# Patient Record
Sex: FEMALE | Race: WHITE | NOT HISPANIC OR LATINO | Employment: OTHER | URBAN - METROPOLITAN AREA
[De-identification: names, ages, dates, MRNs, and addresses within clinical notes are randomized per-mention and may not be internally consistent; named-entity substitution may affect disease eponyms.]

---

## 2018-04-23 PROBLEM — M25.552 BILATERAL HIP PAIN: Status: ACTIVE | Noted: 2018-04-23

## 2018-04-23 PROBLEM — M25.551 BILATERAL HIP PAIN: Status: ACTIVE | Noted: 2018-04-23

## 2018-04-23 PROBLEM — M25.552 PAIN OF BOTH HIP JOINTS: Status: ACTIVE | Noted: 2018-04-23

## 2018-04-23 PROBLEM — M25.551 PAIN OF BOTH HIP JOINTS: Status: ACTIVE | Noted: 2018-04-23

## 2020-02-24 PROBLEM — K31.89: Status: ACTIVE | Noted: 2020-02-24

## 2020-02-24 PROBLEM — Z76.89 ESTABLISHING CARE WITH NEW DOCTOR, ENCOUNTER FOR: Status: ACTIVE | Noted: 2020-02-24

## 2020-02-24 PROBLEM — E03.9 HYPOTHYROIDISM: Status: ACTIVE | Noted: 2020-02-24

## 2020-02-24 PROBLEM — E78.00 ELEVATED CHOLESTEROL: Status: ACTIVE | Noted: 2020-02-24

## 2020-02-25 PROBLEM — F32.9 MAJOR DEPRESSIVE DISORDER: Status: ACTIVE | Noted: 2020-02-25

## 2021-06-15 PROBLEM — Z79.899 ENCOUNTER FOR LONG-TERM CURRENT USE OF MEDICATION: Status: ACTIVE | Noted: 2020-02-24

## 2021-06-15 PROBLEM — N95.1 MENOPAUSAL STATE: Status: ACTIVE | Noted: 2021-06-15

## 2021-06-15 PROBLEM — R79.89 ABNORMAL TSH: Status: ACTIVE | Noted: 2021-06-15

## 2021-06-15 PROBLEM — M25.559 HIP PAIN: Status: ACTIVE | Noted: 2018-04-23

## 2021-06-15 PROBLEM — M25.651 STIFFNESS OF BOTH HIP JOINTS: Status: ACTIVE | Noted: 2021-06-15

## 2021-06-15 PROBLEM — Z00.00 MEDICARE ANNUAL WELLNESS VISIT, SUBSEQUENT: Status: ACTIVE | Noted: 2020-02-24

## 2021-06-15 PROBLEM — E55.9 VITAMIN D DEFICIENCY: Status: ACTIVE | Noted: 2021-06-15

## 2021-06-15 PROBLEM — R25.1 TREMORS OF NERVOUS SYSTEM: Status: ACTIVE | Noted: 2021-06-15

## 2021-06-15 PROBLEM — F32.A DEPRESSION: Status: ACTIVE | Noted: 2020-02-25

## 2021-06-15 PROBLEM — R26.9 ALTERED GAIT: Status: ACTIVE | Noted: 2021-06-15

## 2021-06-15 PROBLEM — M25.652 STIFFNESS OF BOTH HIP JOINTS: Status: ACTIVE | Noted: 2021-06-15

## 2021-06-15 PROBLEM — R26.89 BALANCE PROBLEM: Status: ACTIVE | Noted: 2021-06-15

## 2021-07-12 PROBLEM — F10.29 ALCOHOL DEPENDENCE WITH UNSPECIFIED ALCOHOL-INDUCED DISORDER (HCC): Status: ACTIVE | Noted: 2021-07-12

## 2021-07-20 ENCOUNTER — OFFICE VISIT (OUTPATIENT)
Dept: NEUROLOGY | Facility: MEDICAL CENTER | Age: 66
End: 2021-07-20
Attending: PSYCHIATRY & NEUROLOGY
Payer: MEDICARE

## 2021-07-20 VITALS
RESPIRATION RATE: 16 BRPM | BODY MASS INDEX: 20.61 KG/M2 | SYSTOLIC BLOOD PRESSURE: 118 MMHG | HEART RATE: 84 BPM | WEIGHT: 123.68 LBS | TEMPERATURE: 97.8 F | HEIGHT: 65 IN | DIASTOLIC BLOOD PRESSURE: 68 MMHG | OXYGEN SATURATION: 97 %

## 2021-07-20 DIAGNOSIS — G20.A1 PARKINSON DISEASE (HCC): ICD-10-CM

## 2021-07-20 PROCEDURE — 99203 OFFICE O/P NEW LOW 30 MIN: CPT | Performed by: PSYCHIATRY & NEUROLOGY

## 2021-07-20 PROCEDURE — 99211 OFF/OP EST MAY X REQ PHY/QHP: CPT | Performed by: PSYCHIATRY & NEUROLOGY

## 2021-07-20 ASSESSMENT — FIBROSIS 4 INDEX: FIB4 SCORE: 1.77

## 2021-07-20 NOTE — PATIENT INSTRUCTIONS
Parkinson's Disease  Parkinson's disease is a type of movement disorder. It is a long-term condition that gets worse over time (is progressive). Each person with Parkinson's disease is affected differently.  This condition limits your ability to control movements and move your body normally. The condition can range from mild to severe. Parkinson's disease tends to get worse slowly over several years.  What are the causes?  Parkinson's disease results from a loss of brain cells (neurons) that make a brain chemical called dopamine. Dopamine is needed to control movement. As the condition gets worse, neurons make less dopamine. This makes it hard to move or control your movements.  The exact cause of the loss of neurons and why they make less dopamine is not known. Factors related to genes and the environment may contribute to the cause of Parkinson's disease.  What increases the risk?  The following factors may make you more likely to develop this condition:  · Being male.  · Being age 60 or older.  · Having a family history of Parkinson's disease.  · Having had a traumatic brain injury.  · Having experienced depression.  · Having been exposed to toxins, such as pesticides.  What are the signs or symptoms?  Symptoms of this condition can vary. The main symptoms are related to movement. These include:  · A tremor or shaking while you are resting that you cannot control.  · Stiffness in your neck, arms, and legs (rigidity).  · Slowing of movement. You may lose facial expressions and have trouble making small movements that are needed to button clothing or brush your teeth.  · An abnormal walk. You may walk with short, shuffling steps.  · Loss of balance and stability when standing. You may sway, fall backward, and have trouble making turns.  Other symptoms include:  · Mental or cognitive changes including depression, anxiety, having false beliefs (delusions), or seeing, hearing, or feeling things that do not exist  (hallucinations).  · Trouble speaking or swallowing.  · Changes in bowel or bladder functions including constipation, having to go urgently or frequently, or not being able to control your bowel or bladder.  · Changes in sleep habits or trouble sleeping.  Parkinson's disease may be graded by severity of your condition as mild, moderate, or advanced. Parkinson's disease progression is different for everyone. You may not progress to the advanced stage.  · Mild Parkinson's disease involves:  ? Movement problems that do not affect daily activities.  ? Movement problems on one side of the body.  · Moderate Parkinson's disease involves:  ? Movement problems on both sides of the body.  ? Slowing of movement.  ? Coordination and balance problems.  · Advanced Parkinson's disease involves:  ? Extreme difficulty walking.  ? Inability to live alone safely.  ? Signs of dementia, such as having trouble remembering things, doing daily tasks such as getting dressed, and problem solving.  How is this diagnosed?  This condition is diagnosed by a specialist. A diagnosis may be made based on symptoms, your medical history, and a physical exam. You may also have brain imaging tests to check for a loss of dopamine-producing areas of the brain.  How is this treated?  There is no cure for Parkinson's disease. Treatment focuses on managing your symptoms. Treatment may include:  · Medicines. Everyone responds to medicines differently. Your response may change over time. Work with your health care provider to find the best medicines for you.  · Speech, occupational, and physical therapy.  · Deep brain stimulation surgery to reduce tremors and other involuntary movements.  Follow these instructions at home:  Medicines  · Take over-the-counter and prescription medicines only as told by your health care provider.  · Avoid taking medicines that can affect thinking, such as pain or sleeping medicines.  Eating and drinking  · Follow instructions  from your health care provider about eating or drinking restrictions.  · Do not drink alcohol.  Activity  · Talk with your health care provider about if it is safe for you to drive.  · Do exercises as told by your health care provider or physical therapist.  Lifestyle         · Install grab bars and railings in your home to prevent falls.  · Do not use any products that contain nicotine or tobacco, such as cigarettes, e-cigarettes, and chewing tobacco. If you need help quitting, ask your health care provider.  · Consider joining a support group for people with Parkinson's disease.  General instructions  · Work with your health care provider to determine what you need help with and what your safety needs are.  · Keep all follow-up visits as told by your health care provider, including any visits with a physical therapist, speech therapist, or occupational therapist. This is important.  Contact a health care provider if:  · Medicines do not help your symptoms.  · You are unsteady or have fallen at home.  · You need more support to function well at home.  · You have trouble swallowing.  · You have severe constipation.  · You are having problems with side effects from your medicines.  · You feel confused, anxious, or depressed.  Get help right away if you:  · Are injured after a fall.  · See or hear things that are not real.  · Cannot swallow without choking.  · Have chest pain or trouble breathing.  · Do not feel safe at home.  · Have thoughts about hurting yourself or others.  If you ever feel like you may hurt yourself or others, or have thoughts about taking your own life, get help right away. You can go to your nearest emergency department or call:  · Your local emergency services (911 in the [place]  · A suicide crisis helpline, such as the National Suicide Prevention Lifeline at 1-524.789.3341. This is open 24 hours a day.  Summary  · Parkinson's disease is a long-term condition that gets worse over time. This  condition limits your ability to control your movements and move your body normally.  · There is no cure for Parkinson's disease. Treatment focuses on managing your symptoms.  · Work with your health care provider to determine what you need help with and what your safety needs are.  · Keep all follow-up visits as told by your health care provider, including any visits with a physical therapist, speech therapist, or occupational therapist. This is important.  This information is not intended to replace advice given to you by your health care provider. Make sure you discuss any questions you have with your health care provider.  Document Released: 12/15/2001 Document Revised: 03/05/2020 Document Reviewed: 03/05/2020  Elsevier Patient Education © 2020 [employer]

## 2021-07-20 NOTE — PROGRESS NOTES
Chief Complaint   Patient presents with   • New Patient     Balance problem/Altered gait/Tremors of nervous system       History of present illness:  Maggie Heller 66 y.o. female with depression referred for gait imbalance and tremors.  She has left leg tremor. She has trouble walking and turning to the left.   She was a very athletic person and has gradual progression of these symptoms over the last 2 years, worsened over the last 6-7 months.   Her handwriting is running together and becoming smaller.     Movement-Specific ROS  Anosmia: No   Sleep: She may take a half pill of Advil PM as needed for insomnia, which is effective.    Speech: No problems.   Swallowing: No problems.   Constipation: Yes. This is a chronic problem. Uses a fiber supplement.    Urination: No problems.   Dizziness: No  Cognition: Decline in her memory.   Balance: Feels imbalanced when she walks. She has to talk to herself to pick her leg up to walk.     Past medical history:   Past Medical History:   Diagnosis Date   • Allergy    • Anxiety    • Arthritis    • Clotting disorder (HCC)    • Depression    • Hyperlipidemia    • IBD (inflammatory bowel disease)        Past surgical history:   Past Surgical History:   Procedure Laterality Date   • COLONOSCOPY  7/23/2013    Performed by Chico Morales M.D. at [facility]   • COLONOSCOPY  7/23/2013    Performed by Chico Morales M.D. at [facility]   • APPENDECTOMY     • OTHER ABDOMINAL SURGERY      appendix / bowel rupture       Family history:   Family History   Problem Relation Age of Onset   • Osteoporosis Mother    • Allergies Father    • Depression Father    • Allergies Sister    • Depression Sister    • Hyperlipidemia Sister    • Cancer Brother    • Diabetes Brother        Social history:   Social History     Socioeconomic History   • Marital status:      Spouse name: Not on file   • Number of children: Not on file   • Years of education: Not on file   • Highest  education level: Not on file   Occupational History   • Not on file   Tobacco Use   • Smoking status: Former Smoker     Packs/day: 0.00     Quit date: 2010     Years since quittin.5   • Smokeless tobacco: Never Used   Vaping Use   • Vaping Use: Never used   Substance and Sexual Activity   • Alcohol use: Yes     Comment: wine   • Drug use: No   • Sexual activity: Not Currently   Other Topics Concern   • Not on file   Social History Narrative   • Not on file     Social Determinants of Health     Financial Resource Strain:    • Difficulty of Paying Living Expenses:    Food Insecurity:    • Worried About Running Out of Food in the Last Year:    • Ran Out of Food in the Last Year:    Transportation Needs:    • Lack of Transportation (Medical):    • Lack of Transportation (Non-Medical):    Physical Activity:    • Days of Exercise per Week:    • Minutes of Exercise per Session:    Stress:    • Feeling of Stress :    Social Connections:    • Frequency of Communication with Friends and Family:    • Frequency of Social Gatherings with Friends and Family:    • Attends [employer] Services:    • Active Member of Clubs or Organizations:    • Attends Club or Organization Meetings:    • Marital Status:    Intimate Partner Violence:    • Fear of Current or Ex-Partner:    • Emotionally Abused:    • Physically Abused:    • Sexually Abused:        Current medications:   Current Outpatient Medications   Medication   • levothyroxine (SYNTHROID) 50 MCG Tab   • rosuvastatin (CRESTOR) 10 MG Tab     No current facility-administered medications for this visit.       Medication Allergy:  Allergies   Allergen Reactions   • Pcn [Penicillins] Hives   • Bactrim Ds    • Levaquin    • Seasonal      Physical examination:   Vitals:    21 1604 21 1608   BP: 128/70 118/68   BP Location: Left arm Left arm   Patient Position: Sitting Standing   BP Cuff Size: Adult Adult   Pulse: 73 84   Resp: 16    Temp: 36.6 °C (97.8 °F)    TempSrc:  "Temporal    SpO2: 99% 97%   Weight: 56.1 kg (123 lb 10.9 oz)    Height: 1.651 m (5' 5\")      Neurological Exam  Mental Status  Awake and alert. Speech is normal. Language is fluent with no aphasia.    Motor   Normal muscle tone. The following abnormal movements were seen: L>R bilateral resting lower extremity tremor. Left arm rest tremor. .  Decreased L finger tapping speed and amplitude. Decreased speed of left hand opening/closing.   Left foot tapping amplitude speed is decreased. .    Gait  Casual gait: Normal right arm swing. Normal left arm swing.  Normal gait speed. .      Labs:  I reviewed the following labs personally:  None    Imaging:   None     ASSESSMENT AND PLAN:  Problem List Items Addressed This Visit     Parkinson disease (HCC)          1. Parkinson disease (HCC)    I have counseled the patient that her exam is consistent with mild Parkinson's disease. She has a bilateral lower extremity resting tremor, left arm rest tremor and left arm/leg bradykinesia.   Given that her exam is only mildly affected currently, I did not recommend medications. I have counseled her on exercise. She also may be a good candidate for the PROSEEK study.     FOLLOW-UP:   Return in about 3 months (around 10/20/2021).    Total time spent for the day for this patient unrelated to procedure time is: 31 minutes. I spent 24 minutes in face to face time and I spent 3 minutes pre-charting and 4 minutes in post-visit documentation.      Dr. Phillip Carranza D.O.  [facility]   Movement Disorders Specialist     "

## 2021-07-29 ENCOUNTER — TELEPHONE (OUTPATIENT)
Dept: URGENT CARE | Facility: CLINIC | Age: 66
End: 2021-07-29

## 2021-07-29 NOTE — TELEPHONE ENCOUNTER
Patient contacted by telephone with urine culture negative for UTI.  Patient continues to exhibit symptoms.  Referral placed to urology for further evaluation and management.    Patient was seen by ENT yesterday who felt that she had an exostosis in the left ear canal.  Patient reports dramatic improvement in ear pain taking the cefdinir which she is encouraged to complete.      Leslie John PA-C

## 2021-10-18 PROBLEM — F41.8 ANXIETY ABOUT HEALTH: Status: ACTIVE | Noted: 2021-10-18

## 2021-10-18 PROBLEM — Z09 FOLLOW UP: Status: ACTIVE | Noted: 2020-02-24

## 2021-10-18 PROBLEM — R53.83 FATIGUE: Status: ACTIVE | Noted: 2021-10-18

## 2021-10-18 PROBLEM — R45.89 ANXIETY ABOUT HEALTH: Status: ACTIVE | Noted: 2021-10-18

## 2021-10-19 ENCOUNTER — APPOINTMENT (OUTPATIENT)
Dept: NEUROLOGY | Facility: MEDICAL CENTER | Age: 66
End: 2021-10-19
Payer: MEDICARE

## 2022-02-10 ENCOUNTER — OFFICE VISIT (OUTPATIENT)
Dept: NEUROLOGY | Facility: MEDICAL CENTER | Age: 67
End: 2022-02-10
Attending: PSYCHIATRY & NEUROLOGY
Payer: MEDICARE

## 2022-02-10 VITALS
BODY MASS INDEX: 21.68 KG/M2 | DIASTOLIC BLOOD PRESSURE: 60 MMHG | OXYGEN SATURATION: 98 % | WEIGHT: 127 LBS | RESPIRATION RATE: 16 BRPM | SYSTOLIC BLOOD PRESSURE: 104 MMHG | HEIGHT: 64 IN | HEART RATE: 78 BPM | TEMPERATURE: 97.8 F

## 2022-02-10 DIAGNOSIS — R25.1 TREMOR: ICD-10-CM

## 2022-02-10 DIAGNOSIS — G95.9 DISEASE OF SPINAL CORD (HCC): ICD-10-CM

## 2022-02-10 DIAGNOSIS — G20.A1 PARKINSON DISEASE (HCC): ICD-10-CM

## 2022-02-10 PROCEDURE — 99211 OFF/OP EST MAY X REQ PHY/QHP: CPT | Performed by: PSYCHIATRY & NEUROLOGY

## 2022-02-10 PROCEDURE — 99214 OFFICE O/P EST MOD 30 MIN: CPT | Performed by: PSYCHIATRY & NEUROLOGY

## 2022-02-10 ASSESSMENT — FIBROSIS 4 INDEX: FIB4 SCORE: .976879837895672218

## 2022-02-10 ASSESSMENT — PATIENT HEALTH QUESTIONNAIRE - PHQ9: CLINICAL INTERPRETATION OF PHQ2 SCORE: 0

## 2022-02-10 NOTE — PROGRESS NOTES
Chief Complaint   Patient presents with   • Follow-Up     Parkinson disease       History of present illness:  Maggie Heller 66 y.o. female with mild Parkinson's disease that I diagnosed on 7/20/21. Given that her exam is only mildly affected currently, I did not recommend medications  She feels that she is having trouble turning to the left when she is walking.   Her body feels tight, like a piece of wood.   Her tremors may keep her awake at night and are located in the left leg.     Movement-Specific ROS  Anosmia: No   Sleep: She may take a half pill of Advil PM as needed for insomnia, which is effective.    Speech: No problems.   Swallowing: No problems.   Constipation: Yes. This is a chronic problem. Uses a fiber supplement.    Urination: No problems.   Dizziness: No  Cognition: Decline in her memory.   Balance: Feels imbalanced when she walks. She has to talk to herself to pick her leg up to walk.   Exercise: Walks, and does leg and arm exercises in the morning.   Work: Is a  and stands in place most of the day.     Past medical history:   Past Medical History:   Diagnosis Date   • Allergy    • Anxiety    • Arthritis    • Clotting disorder (HCC)    • Depression    • Hyperlipidemia    • IBD (inflammatory bowel disease)        Past surgical history:   Past Surgical History:   Procedure Laterality Date   • COLONOSCOPY  7/23/2013    [facility]   • COLONOSCOPY  7/23/2013    Performed by [facility] M[facility] at [facility]   • APPENDECTOMY     • OTHER ABDOMINAL SURGERY      appendix / bowel rupture       Family history:   Family History   Problem Relation Age of Onset   • Osteoporosis Mother    • Allergies Father    • Depression Father    • Allergies Sister    • Depression Sister    • Hyperlipidemia Sister    • Cancer Brother    • Diabetes Brother        Social history:   Social History     Socioeconomic History   • Marital status:      Spouse  name: Not on file   • Number of children: Not on file   • Years of education: Not on file   • Highest education level: Not on file   Occupational History   • Not on file   Tobacco Use   • Smoking status: Former Smoker     Packs/day: 0.00     Quit date: 2010     Years since quittin.0   • Smokeless tobacco: Never Used   Vaping Use   • Vaping Use: Never used   Substance and Sexual Activity   • Alcohol use: Yes     Comment: wine   • Drug use: No   • Sexual activity: Not Currently   Other Topics Concern   • Not on file   Social History Narrative   • Not on file     Social Determinants of Health     Financial Resource Strain:    • Difficulty of Paying Living Expenses: Not on file   Food Insecurity:    • Worried About Running Out of Food in the Last Year: Not on file   • Ran Out of Food in the Last Year: Not on file   Transportation Needs:    • Lack of Transportation (Medical): Not on file   • Lack of Transportation (Non-Medical): Not on file   Physical Activity:    • Days of Exercise per Week: Not on file   • Minutes of Exercise per Session: Not on file   Stress:    • Feeling of Stress : Not on file   Social Connections:    • Frequency of Communication with Friends and Family: Not on file   • Frequency of Social Gatherings with Friends and Family: Not on file   • Attends Buddhism Services: Not on file   • Active Member of Clubs or Organizations: Not on file   • Attends Club or Organization Meetings: Not on file   • Marital Status: Not on file   Intimate Partner Violence:    • Fear of Current or Ex-Partner: Not on file   • Emotionally Abused: Not on file   • Physically Abused: Not on file   • Sexually Abused: Not on file   Housing Stability:    • Unable to Pay for Housing in the Last Year: Not on file   • Number of Places Lived in the Last Year: Not on file   • Unstable Housing in the Last Year: Not on file       Current medications:   Current Outpatient Medications   Medication   • rosuvastatin (CRESTOR) 10 MG  "Tab   • levothyroxine (SYNTHROID) 50 MCG Tab   • cefdinir (OMNICEF) 300 MG Cap   • albuterol 108 (90 Base) MCG/ACT Aero Soln inhalation aerosol     No current facility-administered medications for this visit.       Medication Allergy:  Allergies   Allergen Reactions   • Pcn [Penicillins] Hives   • Azithromycin Hives   • Bactrim Ds    • Levaquin    • Seasonal        Physical examination:   Vitals:    02/10/22 0913 02/10/22 0917   BP: 118/64 104/60   BP Location: Left arm Left arm   Patient Position: Sitting Standing   BP Cuff Size: Adult Adult   Pulse: 86 78   Resp: 16    Temp: 36.6 °C (97.8 °F)    TempSrc: Temporal    SpO2: 97% 98%   Weight: 57.6 kg (127 lb)    Height: 1.626 m (5' 4\")      Neurological Exam  Mental Status  Awake and alert. Speech is normal. Language is fluent with no aphasia.    Motor   Increased muscle tone. +Left arm rigidity. The following abnormal movements were seen: Bilateral L>R lower extremity leg tremor .  Mild decrease in amplitude of finger tapping/foot tapping on the left. .    Reflexes                                           Right                      Left  Brachioradialis                    3+                         3+  Biceps                                 3+                         3+  Patellar                                3+                         3+  Achilles                                3+                         3+  Plantar                           Upgoing                Upgoing    Right pathological reflexes: Ivette's present.  Left pathological reflexes: Ivette's present.    Coordination  Right: Finger-to-nose normal. Rapid alternating movement normal.  Left: Finger-to-nose normal. Rapid alternating movement normal.    Gait  Casual gait is normal including stance, stride, and arm swing.       Labs:  I reviewed the following labs personally:  None     Imaging:   None     ASSESSMENT AND PLAN:  Problem List Items Addressed This Visit     Parkinson disease (HCC)    "   Other Visit Diagnoses     Tremor        Relevant Orders    NM-BRAIN IMAGING SPECT SINGLE DAY    Disease of spinal cord (HCC)        Relevant Orders    MR-CERVICAL SPINE-W/O          1. Parkinson disease (HCC)    2. Tremor  - NM-BRAIN IMAGING SPECT SINGLE DAY; Future    3. Disease of spinal cord (HCC)  - MR-CERVICAL SPINE-W/O; Future    66-year-old female with 1 year of bilateral lower extremity tremor, and mild left-sided rigidity and slowness.  I suspect that she has Parkinson's disease, however have ordered a DaTscan to confirm the diagnosis.  Due to hyperreflexia and upgoing Babinski's, I have ordered a MRI of the cervical spine to rule out myelopathy.  I have counseled her that she would be a good candidate for inclusion into the PROSEEK study to test a possible disease slowing drug, if the DaTscan is positive.    FOLLOW-UP:   Return in about 6 months (around 8/10/2022).    Total time spent for the day for this patient unrelated to procedure time is: 30 minutes. I spent 24 minutes in face to face time and I spent 1 minutes pre-charting and 5 minutes in post-visit documentation.      Dr. Phillip Carranza D.O.  [facility]

## 2022-02-10 NOTE — PATIENT INSTRUCTIONS
I have ordered a Pietro scan to evaluate for Parkinson's.     I have also ordered a MRI of the cervical spine (neck) to check for bulging discs in the neck.

## 2022-03-07 ENCOUNTER — HOSPITAL ENCOUNTER (OUTPATIENT)
Dept: RADIOLOGY | Facility: MEDICAL CENTER | Age: 67
End: 2022-03-07
Attending: PSYCHIATRY & NEUROLOGY
Payer: MEDICARE

## 2022-03-07 DIAGNOSIS — G95.9 DISEASE OF SPINAL CORD (HCC): ICD-10-CM

## 2022-03-07 DIAGNOSIS — R25.1 TREMOR: ICD-10-CM

## 2022-03-07 PROCEDURE — 72141 MRI NECK SPINE W/O DYE: CPT

## 2022-03-08 ENCOUNTER — HOSPITAL ENCOUNTER (OUTPATIENT)
Dept: RADIOLOGY | Facility: MEDICAL CENTER | Age: 67
End: 2022-03-08
Attending: PSYCHIATRY & NEUROLOGY
Payer: MEDICARE

## 2022-03-08 DIAGNOSIS — R25.1 TREMOR: ICD-10-CM

## 2022-03-08 PROCEDURE — A9584 IODINE I-123 IOFLUPANE: HCPCS

## 2022-03-17 ENCOUNTER — TELEPHONE (OUTPATIENT)
Dept: NEUROLOGY | Facility: MEDICAL CENTER | Age: 67
End: 2022-03-17
Payer: MEDICARE

## 2022-03-17 NOTE — TELEPHONE ENCOUNTER
Patient called asking for Dr. Carranza to give her a call regarding MRI results from 3/7 and 3/8. Please advise.Thank you.MARIANNA Walker.

## 2022-03-18 ENCOUNTER — PATIENT MESSAGE (OUTPATIENT)
Dept: NEUROLOGY | Facility: MEDICAL CENTER | Age: 67
End: 2022-03-18
Payer: MEDICARE

## 2022-04-27 ENCOUNTER — OFFICE VISIT (OUTPATIENT)
Dept: NEUROLOGY | Facility: MEDICAL CENTER | Age: 67
End: 2022-04-27
Attending: PSYCHIATRY & NEUROLOGY
Payer: MEDICARE

## 2022-04-27 VITALS
TEMPERATURE: 97.9 F | HEART RATE: 86 BPM | WEIGHT: 128.75 LBS | HEIGHT: 64 IN | SYSTOLIC BLOOD PRESSURE: 100 MMHG | RESPIRATION RATE: 18 BRPM | OXYGEN SATURATION: 97 % | BODY MASS INDEX: 21.98 KG/M2 | DIASTOLIC BLOOD PRESSURE: 58 MMHG

## 2022-04-27 DIAGNOSIS — G20.A1 PARKINSON DISEASE (HCC): ICD-10-CM

## 2022-04-27 PROCEDURE — 99214 OFFICE O/P EST MOD 30 MIN: CPT | Performed by: PSYCHIATRY & NEUROLOGY

## 2022-04-27 PROCEDURE — 99211 OFF/OP EST MAY X REQ PHY/QHP: CPT | Performed by: PSYCHIATRY & NEUROLOGY

## 2022-04-27 ASSESSMENT — FIBROSIS 4 INDEX: FIB4 SCORE: .976879837895672218

## 2022-04-27 NOTE — PROGRESS NOTES
Chief Complaint   Patient presents with   • Follow-Up     Parkinson disease       History of present illness:  Maggie Heller 66 y.o. female with mild Parkinson's disease that I diagnosed on 7/20/21. Given that her exam is only mildly affected currently, I did not recommend medications  She feels that she is having trouble turning to the left when she is walking.   Her body feels tight, like a piece of wood.   Her tremors may keep her awake at night and are located in the left leg.   Her walking and turning is worse since the last visit.      Movement-Specific ROS  Anosmia: No   Sleep: She may take a half pill of Advil PM as needed for insomnia, which is effective.    Speech: No problems.   Swallowing: No problems.   Constipation: Yes. This is a chronic problem. Uses a fiber supplement.    Urination: No problems.   Dizziness: No  Cognition: Decline in her memory.   Balance: Feels imbalanced when she walks. She has to talk to herself to pick her leg up to walk.   Exercise: Walks, and does leg and arm exercises in the morning.   Work: Is a  and stands in place most of the day.    Pain: Located from the waist down, in her feet/ankles. The pain is in her thighs down.     Past medical history:   Past Medical History:   Diagnosis Date   • Allergy    • Anxiety    • Arthritis    • Clotting disorder (HCC)    • Depression    • Hyperlipidemia    • IBD (inflammatory bowel disease)        Past surgical history:   Past Surgical History:   Procedure Laterality Date   • COLONOSCOPY  7/23/2013    Performed by Chico Morales M.D. at [facility]   • COLONOSCOPY  7/23/2013    Performed by Chico Morales M.D. at [facility]   • APPENDECTOMY     • OTHER ABDOMINAL SURGERY      appendix / bowel rupture       Family history:   Family History   Problem Relation Age of Onset   • Osteoporosis Mother    • Allergies Father    • Depression Father    • Allergies Sister    • Depression Sister    • Hyperlipidemia  "Sister    • Cancer Brother    • Diabetes Brother        Social history:   Social History     Socioeconomic History   • Marital status:      Spouse name: Not on file   • Number of children: Not on file   • Years of education: Not on file   • Highest education level: Not on file   Occupational History   • Not on file   Tobacco Use   • Smoking status: Former Smoker     Packs/day: 0.00     Quit date: 2010     Years since quittin.2   • Smokeless tobacco: Never Used   Vaping Use   • Vaping Use: Never used   Substance and Sexual Activity   • Alcohol use: Yes     Comment: wine   • Drug use: No   • Sexual activity: Not Currently   Other Topics Concern   • Not on file   Social History Narrative   • Not on file     Social Determinants of Health     Financial Resource Strain: Not on file   Food Insecurity: Not on file   Transportation Needs: Not on file   Physical Activity: Not on file   Stress: Not on file   Social Connections: Not on file   Intimate Partner Violence: Not on file   Housing Stability: Not on file       Current medications:   Current Outpatient Medications   Medication   • carbidopa-levodopa (SINEMET)  MG Tab   • rosuvastatin (CRESTOR) 10 MG Tab   • levothyroxine (SYNTHROID) 50 MCG Tab   • cefdinir (OMNICEF) 300 MG Cap   • albuterol 108 (90 Base) MCG/ACT Aero Soln inhalation aerosol     No current facility-administered medications for this visit.       Medication Allergy:  Allergies   Allergen Reactions   • Pcn [Penicillins] Hives   • Azithromycin Hives   • Bactrim Ds    • Levaquin    • Seasonal        Physical examination:   Vitals:    22 1116 22 1119   BP: 108/72 100/58   BP Location: Left arm Left arm   Patient Position: Sitting Standing   BP Cuff Size: Adult Adult   Pulse: 77 86   Resp: 18    Temp: 36.6 °C (97.9 °F)    TempSrc: Temporal    SpO2: 100% 97%   Weight: 58.4 kg (128 lb 12 oz)    Height: 1.626 m (5' 4\")        There is diminished facial expression.  The patient " has diminished spontaneous movement.  There is no rigidity, however the patient holds her arms tightly to her sides.  There is mild bilateral upper and lower extremity resting tremor.  Her movement speed and amplitude are diminished moderately on both sides.  After standing up to walk, the patient will ambulate with a shortened stride with decreased arm swing bilaterally.    Labs:  I reviewed the following labs personally:  None     Imaging:   3/7/22 MRI cervical spine without contrast:  I reviewed the images personally and agree with the following read:     IMPRESSION:     1.  Multilevel disc degeneration, advanced from C4 through C7.  2.  Moderate to severe spinal stenosis C4-C6, moderate C6-C7 without significant cord compression or abnormal cord signal.  3.  Severe foraminal narrowing bilaterally C5-C7, right C4-C5.    3/8/22 Pietro scan   I reviewed the images personally and agree with the following read:     IMPRESSION:        Abnormal pattern of uptake in the striata, right more than left, suggestive of a Parkinsonian type syndrome.    ASSESSMENT AND PLAN:  Problem List Items Addressed This Visit     Parkinson disease (HCC)    Relevant Medications    carbidopa-levodopa (SINEMET)  MG Tab          1. Parkinson disease (HCC)  - carbidopa-levodopa (SINEMET)  MG Tab; Take 1 Tablet by mouth 3 times a day.  Dispense: 270 Tablet; Refill: 2    Today, I started the patient on carbidopa/levodopa.  Her motor symptoms are bothersome for her she desires symptomatic treatment.  She understands this will eliminate her as a candidate for the PROSEEK study, however we decided this with best option so that she can have the most improvement in her motor symptoms.    FOLLOW-UP:   Return in about 6 months (around 10/27/2022).    Total time spent for the day for this patient unrelated to procedure time is: 30 minutes. I spent 17 minutes in face to face time and I spent 7 minutes pre-charting and 6 minutes in post-visit  documentation.      Dr. Phillip Carranza D.O.  [facility]

## 2022-04-27 NOTE — PATIENT INSTRUCTIONS
Take carbidopa/levodopa with small amounts of food, like bread, crackers. Avoid high amounts of protein or fat within 1 hour of taking carb/levo, which may interfere with the drug.     Start with 1 tab of carb/levo 3 times daily, about every 6 hours. Give it about 2 weeks, but if it does not result in a noticeable improvement in your movement/stiffness, contact me for further instructions.      Have a regular exercise regimen 4 times per week.

## 2022-10-11 PROBLEM — Z09 FOLLOW UP: Status: RESOLVED | Noted: 2020-02-24 | Resolved: 2022-10-11

## 2022-10-17 ENCOUNTER — TELEPHONE (OUTPATIENT)
Dept: NEUROLOGY | Facility: MEDICAL CENTER | Age: 67
End: 2022-10-17
Payer: MEDICARE

## 2022-10-17 NOTE — TELEPHONE ENCOUNTER
Established Patient     [employer] Patient is checked in Patient Demographics? Yes    Is visit type and length correct?  Yes    Is referral attached to visit? Yes    Were records received from referring provider? Yes    Patient was not contacted to have someone accompany them to visit?  Not needed.  Is this appointment scheduled as a Hospital Follow-Up?  Yes    Does the patient require any pre procedure or post procedure follow up? No    If any orders were placed at last visit or intended to be done for this visit do we have Results/Consult Notes? No  Labs - Labs were not ordered at last office visit.  Note: If patient appointment is for lab review and patient did not complete labs, check with provider if OK to reschedule patient until labs completed.  Imaging - Imaging was not ordered at last office visit.  Referrals - Referral ordered, patient has NOT been seen.        10.  If patient appointment is for Botox - is order pended for provider? No, not needed.

## 2022-10-25 ENCOUNTER — OFFICE VISIT (OUTPATIENT)
Dept: NEUROLOGY | Facility: MEDICAL CENTER | Age: 67
End: 2022-10-25
Attending: PSYCHIATRY & NEUROLOGY
Payer: MEDICARE

## 2022-10-25 VITALS
DIASTOLIC BLOOD PRESSURE: 66 MMHG | WEIGHT: 130.07 LBS | BODY MASS INDEX: 22.21 KG/M2 | OXYGEN SATURATION: 99 % | HEART RATE: 104 BPM | SYSTOLIC BLOOD PRESSURE: 108 MMHG | RESPIRATION RATE: 16 BRPM | HEIGHT: 64 IN

## 2022-10-25 DIAGNOSIS — G20.A1 PARKINSON DISEASE (HCC): ICD-10-CM

## 2022-10-25 PROCEDURE — 99212 OFFICE O/P EST SF 10 MIN: CPT | Performed by: PSYCHIATRY & NEUROLOGY

## 2022-10-25 PROCEDURE — 99213 OFFICE O/P EST LOW 20 MIN: CPT | Performed by: PSYCHIATRY & NEUROLOGY

## 2022-10-25 ASSESSMENT — FIBROSIS 4 INDEX: FIB4 SCORE: 0.99

## 2022-10-25 NOTE — PROGRESS NOTES
Chief Complaint   Patient presents with    Injections     Parkinson disease       History of present illness:  Maggie Heller 67 y.o. female with mild Parkinson's disease that I diagnosed on 7/20/21. Given that her exam is only mildly affected currently, I did not recommend medications  She feels that she is having trouble turning to the left when she is walking.   Her body feels tight, like a piece of wood.   Her tremors may keep her awake at night and are located in the left leg.   Her walking and turning is worse since the last visit.       PD Meds:   Carb/levo  1.5 tabs twice daily and 1 tab in the evening     Movement-Specific ROS  Anosmia: No   Sleep: She may take a half pill of Advil PM as needed for insomnia, which is effective.    Speech: No problems.   Swallowing: No problems.   Constipation: Yes. This is a chronic problem. Uses a fiber supplement.    Urination: No problems.   Dizziness: No  Cognition: Decline in her memory.   Balance: Feels imbalanced when she walks. She has to talk to herself to pick her leg up to walk.   Exercise: Walks, and does leg and arm exercises in the morning.   Work: Is a  and stands in place most of the day.    Pain: Located from the waist down, in her feet/ankles. The pain is in her thighs down.      4/27/22: Today, I started the patient on carbidopa/levodopa.  Her motor symptoms are bothersome for her she desires symptomatic treatment.     10/25/22: On her current dose of carb/levo, she has had improvement in slowness/stiffness. She recently had a labral tear.     Past medical history:   Past Medical History:   Diagnosis Date    Allergy     Anxiety     Arthritis     Clotting disorder (HCC)     Depression     Hyperlipidemia     IBD (inflammatory bowel disease)        Past surgical history:   Past Surgical History:   Procedure Laterality Date    COLONOSCOPY  7/23/2013    Performed by Chico Morlaes M.D. at [facility]    COLONOSCOPY  7/23/2013     Performed by Cihco Morales M.D. at SUR[facility]ERY [facility]    APPENDECTOMY      OTHER ABDOMINAL SURGERY      appendix / bowel rupture       Family history:   Family History   Problem Relation Age of Onset    Osteoporosis Mother     Allergies Father     Depression Father     Allergies Sister     Depression Sister     Hyperlipidemia Sister     Cancer Brother     Diabetes Brother        Social history:   Social History     Socioeconomic History    Marital status:      Spouse name: Not on file    Number of children: Not on file    Years of education: Not on file    Highest education level: Not on file   Occupational History    Not on file   Tobacco Use    Smoking status: Former     Packs/day: 0.00     Types: Cigarettes     Quit date: 2010     Years since quittin.7    Smokeless tobacco: Never   Vaping Use    Vaping Use: Never used   Substance and Sexual Activity    Alcohol use: Yes     Comment: wine    Drug use: No    Sexual activity: Not Currently   Other Topics Concern    Not on file   Social History Narrative    Not on file     Social Determinants of Health     Financial Resource Strain: Not on file   Food Insecurity: Not on file   Transportation Needs: Not on file   Physical Activity: Not on file   Stress: Not on file   Social Connections: Not on file   Intimate Partner Violence: Not on file   Housing Stability: Not on file       Current medications:   Current Outpatient Medications   Medication    rosuvastatin (CRESTOR) 10 MG Tab    levothyroxine (SYNTHROID) 50 MCG Tab    ibuprofen (MOTRIN) 200 MG Tab    carbidopa-levodopa (SINEMET)  MG Tab     No current facility-administered medications for this visit.       Medication Allergy:  Allergies   Allergen Reactions    Pcn [Penicillins] Hives    Azithromycin Hives    Bactrim Ds     Levaquin     Seasonal        Physical examination:   Vitals:    10/25/22 1112 10/25/22 1113   BP: 110/64 108/66   BP Location: Left arm Right arm   Patient Position:  "Sitting Sitting   BP Cuff Size: Adult Adult   Pulse: 97 (!) 104   Resp: 16    SpO2: 99% 99%   Weight: 59 kg (130 lb 1.1 oz)    Height: 1.626 m (5' 4\")      Neurological Exam    Motor   The following abnormal movements were seen: Slight bilateral lower extremity rest tremor.    Minimal bradykinesia   Normal spontaneous movement, she is well animated   Speech is normal .    Gait  Casual gait is normal including stance, stride, and arm swing.     Labs:  I reviewed the following labs personally:  None     Imaging:   3/7/22 MRI cervical spine without contrast:  I reviewed the images personally and agree with the following read:      IMPRESSION:     1.  Multilevel disc degeneration, advanced from C4 through C7.  2.  Moderate to severe spinal stenosis C4-C6, moderate C6-C7 without significant cord compression or abnormal cord signal.  3.  Severe foraminal narrowing bilaterally C5-C7, right C4-C5.     3/8/22 Pietro scan   I reviewed the images personally and agree with the following read:      IMPRESSION:        Abnormal pattern of uptake in the striata, right more than left, suggestive of a Parkinsonian type syndrome.    ASSESSMENT AND PLAN:  Problem List Items Addressed This Visit       Parkinson disease (HCC)       1. Parkinson disease (HCC)    67-year-old female that I diagnosed with Parkinson's disease in 2021, currently doing well on carbidopa/levodopa at a dosage of 1.5 tablets twice daily and 1 tablet in the evening.  Her examination on this dose of medication is near normal, and there is a minimal tremor noted in the legs.  We will maintain this current dose of L-dopa.    FOLLOW-UP:   Return in about 6 months (around 4/25/2023).    Total time spent for the day for this patient unrelated to procedure time is: 18 minutes. I spent 13 minutes in face to face time and I spent 3 minutes pre-charting and 2 minutes in post-visit documentation.      Dr. Phillip Carranza D.O.  [facility]    "

## 2022-12-08 NOTE — TELEPHONE ENCOUNTER
Received request via: Patient    Was the patient seen in the last year in this department? Yes    Does the patient have an active prescription (recently filled or refills available) for medication(s) requested? Yes.     Does the patient have custodial Plus and need 100 day supply (blood pressure, diabetes and cholesterol meds only)? No

## 2023-01-03 PROBLEM — M20.21 ACQUIRED HALLUX RIGIDUS OF RIGHT FOOT: Status: ACTIVE | Noted: 2022-11-08

## 2023-02-16 PROBLEM — M79.671 RIGHT FOOT PAIN: Status: ACTIVE | Noted: 2023-02-16

## 2023-04-18 ENCOUNTER — APPOINTMENT (OUTPATIENT)
Dept: NEUROLOGY | Facility: MEDICAL CENTER | Age: 68
End: 2023-04-18
Attending: PSYCHIATRY & NEUROLOGY
Payer: COMMERCIAL

## 2023-07-10 ENCOUNTER — TELEPHONE (OUTPATIENT)
Dept: NEUROLOGY | Facility: MEDICAL CENTER | Age: 68
End: 2023-07-10
Payer: MEDICARE

## 2023-07-11 ENCOUNTER — OFFICE VISIT (OUTPATIENT)
Dept: NEUROLOGY | Facility: MEDICAL CENTER | Age: 68
End: 2023-07-11
Attending: PSYCHIATRY & NEUROLOGY
Payer: MEDICARE

## 2023-07-11 VITALS
HEIGHT: 65 IN | OXYGEN SATURATION: 99 % | TEMPERATURE: 97.1 F | BODY MASS INDEX: 20.68 KG/M2 | WEIGHT: 124.12 LBS | SYSTOLIC BLOOD PRESSURE: 114 MMHG | HEART RATE: 92 BPM | DIASTOLIC BLOOD PRESSURE: 58 MMHG

## 2023-07-11 DIAGNOSIS — G20.A1 PARKINSON DISEASE (HCC): ICD-10-CM

## 2023-07-11 PROCEDURE — 99212 OFFICE O/P EST SF 10 MIN: CPT | Performed by: PSYCHIATRY & NEUROLOGY

## 2023-07-11 PROCEDURE — 99213 OFFICE O/P EST LOW 20 MIN: CPT | Performed by: PSYCHIATRY & NEUROLOGY

## 2023-07-11 PROCEDURE — 3078F DIAST BP <80 MM HG: CPT | Performed by: PSYCHIATRY & NEUROLOGY

## 2023-07-11 PROCEDURE — 3074F SYST BP LT 130 MM HG: CPT | Performed by: PSYCHIATRY & NEUROLOGY

## 2023-07-11 ASSESSMENT — PATIENT HEALTH QUESTIONNAIRE - PHQ9: CLINICAL INTERPRETATION OF PHQ2 SCORE: 0

## 2023-07-11 ASSESSMENT — FIBROSIS 4 INDEX: FIB4 SCORE: 1.97

## 2023-07-11 NOTE — PROGRESS NOTES
"Chief Complaint   Patient presents with    Follow-Up     Movement Disorder       History of present illness:  Maggie Heller 68 y.o. female with mild Parkinson's disease that I diagnosed on 7/20/21. Given that her exam is only mildly affected currently, I did not recommend medications  She feels that she is having trouble turning to the left when she is walking.   Her body feels tight, like a piece of wood.   Her tremors may keep her awake at night and are located in the left leg.   Her walking and turning is worse since the last visit.       PD Meds:   Carb/levo  1.5 tabs twice daily and 1 tab in the evening. She takes it every 6 hours after 4.5-5 hours she feels \"anxious\" where she is not relaxed or calm. After 35-45 minutes, the drug takes effect and she is fine for 5 hours. This occurs about 3 times per week.       Movement-Specific ROS  Anosmia: No   Sleep: She may take a half pill of Advil PM as needed for insomnia, which is effective.    Speech: No problems.   Swallowing: No problems.   Constipation: Yes. This is a chronic problem. Uses a fiber supplement.    Urination: No problems.   Dizziness: No  Cognition: Decline in her memory.   Balance: Feels imbalanced when she walks. She has to talk to herself to pick her leg up to walk.   Exercise: Walks, and does leg and arm exercises in the morning.   Work: Is a  and stands in place most of the day.    Pain: Located from the waist down, in her feet/ankles. The pain is in her thighs down.       4/27/22: Today, I started the patient on carbidopa/levodopa.  Her motor symptoms are bothersome for her she desires symptomatic treatment.      10/25/22: On her current dose of carb/levo, she has had improvement in slowness/stiffness. She recently had a labral tear.     7/11/23:  The tremor of the left leg is a little worse and her handwriting is a little worse. Her left side is affected but her right side is fairly normal. She wakes up with tremors of her " left leg. She is able to relax when it occurs and it does not keep her up at night. She uses tylenol PM if needed for sleep.       Past medical history:   Past Medical History:   Diagnosis Date    Allergy     Anxiety     Arthritis     Clotting disorder (HCC)     Depression     Hyperlipidemia     IBD (inflammatory bowel disease)        Past surgical history:   Past Surgical History:   Procedure Laterality Date    PB FUSION BIG TOE,MT-P JT Right 3/27/2023    Procedure: RIGHT FOOT HALLUX METATARSOPHALANGEAL FUSION;  Surgeon: Sudeep Muniz M.D.;  Location: [facility];  Service: Orthopedics    PB REMV BONE FOR GRAFT MAJOR Right 3/27/2023    Procedure: CALCANEAL AUTOGRAFT;  Surgeon: Sudeep Muniz M.D.;  Location: [facility];  Service: Orthopedics    COLONOSCOPY  2013    Performed by Chico Morales M.D. at SURGERY [facility]    COLONOSCOPY  2013    Performed by Chico Morales M.D. at [facility]    APPENDECTOMY      OTHER ABDOMINAL SURGERY      appendix / bowel rupture       Family history:   Family History   Problem Relation Age of Onset    Osteoporosis Mother     Allergies Father     Depression Father     Allergies Sister     Depression Sister     Hyperlipidemia Sister     Cancer Brother     Diabetes Brother        Social history:   Social History     Socioeconomic History    Marital status:      Spouse name: Not on file    Number of children: Not on file    Years of education: Not on file    Highest education level: Not on file   Occupational History    Not on file   Tobacco Use    Smoking status: Former     Packs/day: 0.00     Types: Cigarettes     Quit date: 2010     Years since quittin.4    Smokeless tobacco: Never   Vaping Use    Vaping Use: Never used   Substance and Sexual Activity    Alcohol use: Yes     Alcohol/week: 1.8 oz     Types: 3 Glasses of wine per week     Comment: every day    Drug use: No    Sexual  "activity: Not Currently   Other Topics Concern    Not on file   Social History Narrative    Not on file     Social Determinants of Health     Financial Resource Strain: Not on file   Food Insecurity: Not on file   Transportation Needs: Not on file   Physical Activity: Not on file   Stress: Not on file   Social Connections: Not on file   Intimate Partner Violence: Not on file   Housing Stability: Not on file       Current medications:   Current Outpatient Medications   Medication    levothyroxine (SYNTHROID) 50 MCG Tab    acetaminophen (TYLENOL) 500 MG Tab    rosuvastatin (CRESTOR) 10 MG Tab    carbidopa-levodopa (SINEMET)  MG Tab     No current facility-administered medications for this visit.       Medication Allergy:  Allergies   Allergen Reactions    Pcn [Penicillins] Hives    Azithromycin Hives    Bactrim Ds     Levaquin     Seasonal     Sulfa Drugs        Physical examination:   Vitals:    07/11/23 1007 07/11/23 1013   BP: 122/66 114/58   BP Location: Right arm Right arm   Patient Position: Sitting Standing   BP Cuff Size: Adult Adult   Pulse: 78 92   Temp: 36.2 °C (97.1 °F)    TempSrc: Temporal    SpO2: 97% 99%   Weight: 56.3 kg (124 lb 1.9 oz)    Height: 1.651 m (5' 5\")      Neurological Exam    Motor   The following abnormal movements were seen: Mild bilateral lower extremity rest tremor  No resting tremor of the arms.    Normal speed and amplitude of rapid finger and foot tapping, hand opening/closing and pronation/supination bilaterally.     .    Coordination  Right: Finger-to-nose normal.Left: Finger-to-nose normal.    Gait  Casual gait is normal including stance, stride, and arm swing.  Normal gait speed .       Labs:  I reviewed the following labs personally:  None    Imaging:   None     ASSESSMENT AND PLAN:  Problem List Items Addressed This Visit       Parkinson disease (HCC)       1. Parkinson disease (HCC)    Currently, she is doing well on her current dose of carbidopa/levodopa.  She has mild " bilateral resting tremors of the lower extremities but her speed of movement is normal.  She is taking 1-1/2 tablets of Sinemet twice daily and 1 tablet in the evening.  Have counseled her to take this on an empty stomach to maximize the effect and to speed up the time to onset.   No changes were made to her medications today.    FOLLOW-UP:   Return in about 6 months (around 1/11/2024).    Total time spent for the day for this patient unrelated to procedure time is: 20 minutes. I spent 14 minutes in face to face time and I spent 3 minutes pre-charting and 3 minutes in post-visit documentation.      BRITTNEY BriceñoO.  [facility]

## 2023-09-05 DIAGNOSIS — G20.A1 PARKINSON DISEASE (HCC): ICD-10-CM

## 2023-09-05 NOTE — TELEPHONE ENCOUNTER
Received request via: Patient    Was the patient seen in the last year in this department? Yes    Does the patient have an active prescription (recently filled or refills available) for medication(s) requested? Yes.    Does the patient have residential Plus and need 100 day supply (blood pressure, diabetes and cholesterol meds only)? No    Patient is out of medication. MARIANNA Walker.

## 2023-09-06 ENCOUNTER — TELEPHONE (OUTPATIENT)
Dept: NEUROLOGY | Facility: MEDICAL CENTER | Age: 68
End: 2023-09-06
Payer: MEDICARE

## 2023-09-06 NOTE — TELEPHONE ENCOUNTER
1. Caller Name: Maggie Heller                            2.Call Back Number: [phone]    3. How would the patient prefer to be contacted with a response: Phone call OK to leave a detailed message    Patient called wanted to find out if she can up dose Carbidopa Levodopa to half more tablet daily? Tremors are worst and so uncomfortable. Patient wanted to know if it is okay to take 1 1/2 tablet in the am, 2 tablets in the next 6 hours, and 1 tablet at night? Please advise. Patient wanted a call back. Thank you. MARIANNA Walker.

## 2023-09-07 NOTE — TELEPHONE ENCOUNTER
Please contact the patient and inform her that I am fine with her making the recommended change to the carbidopa/levodopa that she has provided.    BRITTNEY BriceñoO.  [facility]

## 2023-12-18 ENCOUNTER — TELEPHONE (OUTPATIENT)
Dept: NEUROLOGY | Facility: MEDICAL CENTER | Age: 68
End: 2023-12-18
Payer: MEDICARE

## 2023-12-18 NOTE — TELEPHONE ENCOUNTER

## 2023-12-19 ENCOUNTER — TELEPHONE (OUTPATIENT)
Dept: NEUROLOGY | Facility: MEDICAL CENTER | Age: 68
End: 2023-12-19

## 2023-12-19 ENCOUNTER — OFFICE VISIT (OUTPATIENT)
Dept: NEUROLOGY | Facility: MEDICAL CENTER | Age: 68
End: 2023-12-19
Attending: PSYCHIATRY & NEUROLOGY
Payer: MEDICARE

## 2023-12-19 VITALS
DIASTOLIC BLOOD PRESSURE: 64 MMHG | SYSTOLIC BLOOD PRESSURE: 102 MMHG | BODY MASS INDEX: 20.7 KG/M2 | HEIGHT: 64 IN | RESPIRATION RATE: 18 BRPM | OXYGEN SATURATION: 100 % | TEMPERATURE: 97.2 F | WEIGHT: 121.25 LBS | HEART RATE: 85 BPM

## 2023-12-19 DIAGNOSIS — G20.A1 PARKINSON DISEASE (HCC): ICD-10-CM

## 2023-12-19 PROCEDURE — 99214 OFFICE O/P EST MOD 30 MIN: CPT | Performed by: PSYCHIATRY & NEUROLOGY

## 2023-12-19 PROCEDURE — 3078F DIAST BP <80 MM HG: CPT | Performed by: PSYCHIATRY & NEUROLOGY

## 2023-12-19 PROCEDURE — 3074F SYST BP LT 130 MM HG: CPT | Performed by: PSYCHIATRY & NEUROLOGY

## 2023-12-19 PROCEDURE — 99211 OFF/OP EST MAY X REQ PHY/QHP: CPT | Performed by: PSYCHIATRY & NEUROLOGY

## 2023-12-19 ASSESSMENT — FIBROSIS 4 INDEX: FIB4 SCORE: 1.97

## 2023-12-19 NOTE — PROGRESS NOTES
"    Chief Complaint   Patient presents with    Follow-Up     Parkinson disease       History of present illness:  Maggie Heller 68 y.o. female with  mild Parkinson's disease that I diagnosed on 7/20/21. Given that her exam is only mildly affected currently, I did not recommend medications  She feels that she is having trouble turning to the left when she is walking.   Her body feels tight, like a piece of wood.   Her tremors may keep her awake at night and are located in the left leg.   Her walking and turning is worse since the last visit.       PD Meds:   Carb/levo  1.5 tabs twice daily and 1 tab in the evening. She takes it every 6 hours after 4.5-5 hours she feels \"anxious\" where she is not relaxed or calm. After 35-45 minutes, the drug takes effect and she is fine for 5 hours. This occurs about 3 times per week.       Movement-Specific ROS  Anosmia: No   Sleep: She may take a half pill of Advil PM as needed for insomnia, which is effective.    Speech: No problems.   Swallowing: No problems.   Constipation: Yes. This is a chronic problem. Uses a fiber supplement.    Urination: No problems.   Dizziness: No  Cognition: Decline in her memory.   Balance: Feels imbalanced when she walks. She has to talk to herself to pick her leg up to walk.   Exercise: Walks, and does leg and arm exercises in the morning.   Work: Is a  and stands in place most of the day.    Pain: Located from the waist down, in her feet/ankles. The pain is in her thighs down.       4/27/22: Today, I started the patient on carbidopa/levodopa.  Her motor symptoms are bothersome for her she desires symptomatic treatment.      10/25/22: On her current dose of carb/levo, she has had improvement in slowness/stiffness. She recently had a labral tear.      7/11/23:  The tremor of the left leg is a little worse and her handwriting is a little worse. Her left side is affected but her right side is fairly normal. She wakes up with tremors of " her left leg. She is able to relax when it occurs and it does not keep her up at night. She uses tylenol PM if needed for sleep.      12/19/23: She feels that her carb/levo at a dose of 1.5/1/1 every 6 hours is wearing OFF at 4.5 hours. Her body locks up when she WEARS OFF. She is now having tremors of the R side as well, and previously it was the LEFT side only. She is able to hike/climb hills when the drug is working.     Past medical history:   Past Medical History:   Diagnosis Date    Allergy     Anxiety     Arthritis     Clotting disorder (HCC)     Depression     Hyperlipidemia     IBD (inflammatory bowel disease)        Past surgical history:   Past Surgical History:   Procedure Laterality Date    PB FUSION BIG TOE,MT-P JT Right 3/27/2023    Procedure: RIGHT FOOT HALLUX METATARSOPHALANGEAL FUSION;  Surgeon: Sudeep Muniz M.D.;  Location: [facility];  Service: Orthopedics    PB REMV BONE FOR GRAFT MAJOR Right 3/27/2023    Procedure: CALCANEAL AUTOGRAFT;  Surgeon: Sudeep Muniz M.D.;  Location: [facility];  Service: Orthopedics    COLONOSCOPY  7/23/2013    Performed by Chico Morales M.D. at [facility]    COLONOSCOPY  7/23/2013    Performed by Chico Morales M.D. at [facility]    APPENDECTOMY      OTHER ABDOMINAL SURGERY      appendix / bowel rupture       Family history:   Family History   Problem Relation Age of Onset    Osteoporosis Mother     Allergies Father     Depression Father     Allergies Sister     Depression Sister     Hyperlipidemia Sister     Cancer Brother     Diabetes Brother        Social history:   Social History     Socioeconomic History    Marital status:      Spouse name: Not on file    Number of children: Not on file    Years of education: Not on file    Highest education level: Not on file   Occupational History    Not on file   Tobacco Use    Smoking status: Former     Current packs/day: 0.00      "Types: Cigarettes     Quit date: 2010     Years since quittin.9    Smokeless tobacco: Never   Vaping Use    Vaping Use: Never used   Substance and Sexual Activity    Alcohol use: Yes     Alcohol/week: 1.8 oz     Types: 3 Glasses of wine per week     Comment: every day    Drug use: No    Sexual activity: Not Currently   Other Topics Concern    Not on file   Social History Narrative    Not on file     Social Determinants of Health     Financial Resource Strain: Not on file   Food Insecurity: Not on file   Transportation Needs: Not on file   Physical Activity: Not on file   Stress: Not on file   Social Connections: Not on file   Intimate Partner Violence: Not on file   Housing Stability: Not on file       Current medications:   Current Outpatient Medications   Medication    rosuvastatin (CRESTOR) 10 MG Tab    levothyroxine (SYNTHROID) 50 MCG Tab    acetaminophen (TYLENOL) 500 MG Tab    carbidopa-levodopa (SINEMET)  MG Tab    cefdinir (OMNICEF) 300 MG Cap     No current facility-administered medications for this visit.       Medication Allergy:  Allergies   Allergen Reactions    Penicillins Hives     Other reaction(s): Not available    Azithromycin Hives    Bactrim Ds     Levaquin     Omnicef [Cefdinir]     Seasonal     Sulfa Drugs      Other reaction(s): Not available    Sulfamethoxazole W-Trimethoprim      Other reaction(s): Not available       Physical examination:   Vitals:    23 0951 23 0953   BP: 110/72 102/64   BP Location: Left arm Left arm   Patient Position: Sitting Standing   BP Cuff Size: Adult Adult   Pulse: 83 85   Resp: 18    Temp: 36.2 °C (97.2 °F)    TempSrc: Temporal    SpO2: 98% 100%   Weight: 55 kg (121 lb 4.1 oz)    Height: 1.626 m (5' 4\")      Neurological Exam    Motor    No tremors .    Gait  Casual gait is normal including stance, stride, and arm swing.       Labs:  I reviewed the following labs personally:  None    Imaging:   None     ASSESSMENT AND PLAN:  Problem " List Items Addressed This Visit       Parkinson disease    Relevant Medications    carbidopa-levodopa (SINEMET)  MG Tab       1. Parkinson disease  - carbidopa-levodopa (SINEMET)  MG Tab; Take 1.5 Tablets by mouth every morning AND 1 Tablet 3 times a day.  Dispense: 405 Tablet; Refill: 2    Her Parkinson's disease is well treated when she is on carbidopa levodopa, however in between doses there is now wearing off.  Every dose has been wearing off after about 4 hours.  I have counseled her to increase the frequency of the L-dopa to 4 times daily, every 4 hours while awake.  She will now take a total of 4-1/2 tablets/day.  Otherwise, she is doing well, and there is minimal impairment while the drug is working.    FOLLOW-UP:   Return in about 6 months (around 6/19/2024).    Dr. Phillip Carranza D.O.  [facility]

## 2023-12-19 NOTE — TELEPHONE ENCOUNTER
Carbidopa-Levodopa  MG Tabs    RTS - RX:[id number] DT:2023-12-01 DS:90 RD: 2024-02-11  + - 12/19/2023 11:40am [facility]

## 2023-12-19 NOTE — PATIENT INSTRUCTIONS
Increase the frequency of carbidopa/levodopa to 4 times daily (every 4 hours)    Take 1.5 pills in the morning, followed by 1 tab every 4 hours after while awake.     Take an extra 1/2 tablet of carb/levo as a rescue dose to bridge you until the next dose if needed.

## 2024-05-10 DIAGNOSIS — G20.A1 PARKINSON DISEASE (HCC): ICD-10-CM

## 2024-05-14 NOTE — TELEPHONE ENCOUNTER
Received request via: Pharmacy    Medication Name/Dosage carbidopa-[id number]/100mg     When was medication last prescribed 12/19/23    How many refills were previously provided 2      How many Refills does he patient have left from last prescription 0      Was the patient seen in the last year in this department? Yes   Date of last office visit 12/19/23     Per last Neurology Office Visit, when was the date of next follow up visit set for?                            Date of office visit follow up request 6mo     Does the patient have an upcoming appointment? Yes   If yes, when 06/21/24             If no, schedule appointment 0      Does the patient have [facility] and need 100 day supply (blood pressure, diabetes and cholesterol meds only)? Patient does not have SCP

## 2024-05-16 ENCOUNTER — TELEPHONE (OUTPATIENT)
Dept: NEUROLOGY | Facility: MEDICAL CENTER | Age: 69
End: 2024-05-16
Payer: MEDICARE

## 2024-05-16 NOTE — TELEPHONE ENCOUNTER
Received Refill PA request via [facility]  for Carbidopa-Levodopa  MG Tabs. (Quantity:405, Day Supply:90)     Insurance: [employer] ([employer])  Member ID:  [id number]  BIN: [id number]  PCN: [id number]  Group: [id number]     Ran Test claim via [facility] medication Pays for a $30.00 copay. Will outreach to patient to offer specialty pharmacy services and or release to preferred pharmacy

## 2024-05-16 NOTE — TELEPHONE ENCOUNTER
Attempted to contact patient at [phone] to discuss [facility] and services/benefits offered. No answer, left voicemail.    Kadi Chappell

## 2024-06-20 ENCOUNTER — TELEPHONE (OUTPATIENT)
Dept: HEALTH INFORMATION MANAGEMENT | Facility: OTHER | Age: 69
End: 2024-06-20
Payer: MEDICARE

## 2024-06-21 ENCOUNTER — APPOINTMENT (OUTPATIENT)
Dept: NEUROLOGY | Facility: MEDICAL CENTER | Age: 69
End: 2024-06-21
Attending: PSYCHIATRY & NEUROLOGY
Payer: MEDICARE